# Patient Record
Sex: MALE | Race: WHITE | URBAN - METROPOLITAN AREA
[De-identification: names, ages, dates, MRNs, and addresses within clinical notes are randomized per-mention and may not be internally consistent; named-entity substitution may affect disease eponyms.]

---

## 2017-01-01 ENCOUNTER — MEDICAL CORRESPONDENCE (OUTPATIENT)
Dept: TRANSPLANT | Facility: CLINIC | Age: 72
End: 2017-01-01

## 2017-01-01 ENCOUNTER — TEAM CONFERENCE (OUTPATIENT)
Dept: TRANSPLANT | Facility: CLINIC | Age: 72
End: 2017-01-01

## 2017-01-01 ENCOUNTER — TELEPHONE (OUTPATIENT)
Dept: TRANSPLANT | Facility: CLINIC | Age: 72
End: 2017-01-01

## 2017-01-03 NOTE — TELEPHONE ENCOUNTER
Patient's daughter, Gianna, called to say that the family does not believe Maximilian is a candidate for transplant given all of his medical issues; recently had leg amputate at [facility] in November. Discuss with Gianna that if Dr. Watts verbalizes or sends a note stating that Maximilian is not a candidate, we can take his case to committee and probably have him delisted. If Dr. Watts is not willing to state that, we would need to bring him here to see surgeon to help determine his candidacy. Asked Gianna to call me and let me know if I can help further.

## 2017-01-18 NOTE — TELEPHONE ENCOUNTER
TEAM CONF:     ATTENDEES:  Ofe Hahn SW, Coordinators, Dietary and Pharmacy.     DISCUSSION and OUTCOME:  Records reviewed from recent hospitalization. It was determined that Maximilian is no longer a transplant candidate.     Call to Mrs. Henry today. She understands the team decision. I got the nursing home number from her and called Maximilian. Mrs. Henry will let Gianna know about this decision. Letters sent.

## 2017-01-18 NOTE — Clinical Note
January 18, 2017    Maximilian Henry  [address] [place] [id number]      Dear Mr. Henry,    The purpose of this letter is to let you know the [facility] Multi-Disciplinary Selection Team made the decision to remove you from the kidney transplant list.  This is because it is felt your medical condition would be best managed with long term dialysis.   Important things you should know:    If you would like to discuss the decision, or if your medical status changes, you may call [phone] and ask to speak to your transplant coordinator.    We recommend that you continue to follow up with your primary care and referring physicians in order to manage your health concerns.    ALA samples no longer need to be sent by your dialysis unit.   Enclosed is a letter from [employer] which describes the services offered to patients by [facility] and the [facility].  Thank you for allowing us to participate in your care.  We wish you well.    Sincerely,    Kidney Transplant Team  Enclosure:  [employer] Letter   Cc: Layo Guzmán MD; [facility]